# Patient Record
Sex: MALE | Race: ASIAN | NOT HISPANIC OR LATINO | Employment: PART TIME | URBAN - METROPOLITAN AREA
[De-identification: names, ages, dates, MRNs, and addresses within clinical notes are randomized per-mention and may not be internally consistent; named-entity substitution may affect disease eponyms.]

---

## 2023-07-02 ENCOUNTER — HOSPITAL ENCOUNTER (EMERGENCY)
Facility: HOSPITAL | Age: 23
Discharge: HOME | End: 2023-07-02
Attending: EMERGENCY MEDICINE
Payer: COMMERCIAL

## 2023-07-02 ENCOUNTER — APPOINTMENT (EMERGENCY)
Dept: CT IMAGING | Facility: HOSPITAL | Age: 23
End: 2023-07-02
Payer: COMMERCIAL

## 2023-07-02 ENCOUNTER — APPOINTMENT (EMERGENCY)
Dept: RADIOLOGY | Facility: HOSPITAL | Age: 23
End: 2023-07-02
Payer: COMMERCIAL

## 2023-07-02 VITALS
SYSTOLIC BLOOD PRESSURE: 112 MMHG | HEART RATE: 70 BPM | OXYGEN SATURATION: 100 % | TEMPERATURE: 99.1 F | WEIGHT: 150 LBS | DIASTOLIC BLOOD PRESSURE: 67 MMHG | RESPIRATION RATE: 18 BRPM | BODY MASS INDEX: 21.47 KG/M2 | HEIGHT: 70 IN

## 2023-07-02 DIAGNOSIS — R11.2 NAUSEA AND VOMITING, UNSPECIFIED VOMITING TYPE: Primary | ICD-10-CM

## 2023-07-02 DIAGNOSIS — R10.84 GENERALIZED ABDOMINAL PAIN: ICD-10-CM

## 2023-07-02 DIAGNOSIS — E86.0 DEHYDRATION: ICD-10-CM

## 2023-07-02 LAB
ALBUMIN SERPL BCP-MCNC: 5.5 G/DL (ref 3.5–5)
ALP SERPL-CCNC: 67 U/L (ref 34–104)
ALT SERPL W P-5'-P-CCNC: 25 U/L (ref 7–52)
AMPHETAMINES SERPL QL SCN: NEGATIVE
ANION GAP SERPL CALCULATED.3IONS-SCNC: 13 MMOL/L
APAP SERPL-MCNC: <10 UG/ML (ref 10–20)
AST SERPL W P-5'-P-CCNC: 30 U/L (ref 13–39)
BACTERIA UR QL AUTO: ABNORMAL /HPF
BARBITURATES UR QL: NEGATIVE
BASOPHILS # BLD AUTO: 0.04 THOUSANDS/ÂΜL (ref 0–0.1)
BASOPHILS NFR BLD AUTO: 0 % (ref 0–1)
BENZODIAZ UR QL: NEGATIVE
BILIRUB SERPL-MCNC: 1.07 MG/DL (ref 0.2–1)
BILIRUB UR QL STRIP: ABNORMAL
BUN SERPL-MCNC: 8 MG/DL (ref 5–25)
CALCIUM SERPL-MCNC: 10 MG/DL (ref 8.4–10.2)
CARDIAC TROPONIN I PNL SERPL HS: 4 NG/L
CHLORIDE SERPL-SCNC: 100 MMOL/L (ref 96–108)
CK SERPL-CCNC: 28 U/L (ref 39–308)
CLARITY UR: CLEAR
CO2 SERPL-SCNC: 24 MMOL/L (ref 21–32)
COCAINE UR QL: NEGATIVE
COLOR UR: YELLOW
CREAT SERPL-MCNC: 0.69 MG/DL (ref 0.6–1.3)
D DIMER PPP FEU-MCNC: <0.27 UG/ML FEU
EOSINOPHIL # BLD AUTO: 0.02 THOUSAND/ÂΜL (ref 0–0.61)
EOSINOPHIL NFR BLD AUTO: 0 % (ref 0–6)
ERYTHROCYTE [DISTWIDTH] IN BLOOD BY AUTOMATED COUNT: 13.1 % (ref 11.6–15.1)
ETHANOL SERPL-MCNC: <10 MG/DL
GFR SERPL CREATININE-BSD FRML MDRD: 133 ML/MIN/1.73SQ M
GLUCOSE SERPL-MCNC: 112 MG/DL (ref 65–140)
GLUCOSE UR STRIP-MCNC: NEGATIVE MG/DL
HCT VFR BLD AUTO: 46.3 % (ref 36.5–49.3)
HGB BLD-MCNC: 16.2 G/DL (ref 12–17)
HGB UR QL STRIP.AUTO: ABNORMAL
IMM GRANULOCYTES # BLD AUTO: 0.03 THOUSAND/UL (ref 0–0.2)
IMM GRANULOCYTES NFR BLD AUTO: 0 % (ref 0–2)
KETONES UR STRIP-MCNC: ABNORMAL MG/DL
LEUKOCYTE ESTERASE UR QL STRIP: NEGATIVE
LIPASE SERPL-CCNC: 14 U/L (ref 11–82)
LYMPHOCYTES # BLD AUTO: 2.09 THOUSANDS/ÂΜL (ref 0.6–4.47)
LYMPHOCYTES NFR BLD AUTO: 20 % (ref 14–44)
MCH RBC QN AUTO: 30.6 PG (ref 26.8–34.3)
MCHC RBC AUTO-ENTMCNC: 35 G/DL (ref 31.4–37.4)
MCV RBC AUTO: 88 FL (ref 82–98)
METHADONE UR QL: NEGATIVE
MONOCYTES # BLD AUTO: 0.79 THOUSAND/ÂΜL (ref 0.17–1.22)
MONOCYTES NFR BLD AUTO: 8 % (ref 4–12)
MUCOUS THREADS UR QL AUTO: ABNORMAL
NEUTROPHILS # BLD AUTO: 7.35 THOUSANDS/ÂΜL (ref 1.85–7.62)
NEUTS SEG NFR BLD AUTO: 72 % (ref 43–75)
NITRITE UR QL STRIP: NEGATIVE
NON-SQ EPI CELLS URNS QL MICRO: ABNORMAL /HPF
NRBC BLD AUTO-RTO: 0 /100 WBCS
OPIATES UR QL SCN: NEGATIVE
OXYCODONE+OXYMORPHONE UR QL SCN: NEGATIVE
PCP UR QL: NEGATIVE
PH UR STRIP.AUTO: 6.5 [PH]
PLATELET # BLD AUTO: 259 THOUSANDS/UL (ref 149–390)
PMV BLD AUTO: 9.8 FL (ref 8.9–12.7)
POTASSIUM SERPL-SCNC: 3.5 MMOL/L (ref 3.5–5.3)
PROT SERPL-MCNC: 8.1 G/DL (ref 6.4–8.4)
PROT UR STRIP-MCNC: ABNORMAL MG/DL
RBC # BLD AUTO: 5.29 MILLION/UL (ref 3.88–5.62)
RBC #/AREA URNS AUTO: ABNORMAL /HPF
SALICYLATES SERPL-MCNC: <5 MG/DL (ref 3–20)
SODIUM SERPL-SCNC: 137 MMOL/L (ref 135–147)
SP GR UR STRIP.AUTO: 1.01 (ref 1–1.03)
THC UR QL: POSITIVE
TSH SERPL DL<=0.05 MIU/L-ACNC: 0.66 UIU/ML (ref 0.45–4.5)
UROBILINOGEN UR QL STRIP.AUTO: 0.2 E.U./DL
WBC # BLD AUTO: 10.32 THOUSAND/UL (ref 4.31–10.16)
WBC #/AREA URNS AUTO: ABNORMAL /HPF

## 2023-07-02 PROCEDURE — 80143 DRUG ASSAY ACETAMINOPHEN: CPT | Performed by: EMERGENCY MEDICINE

## 2023-07-02 PROCEDURE — 99285 EMERGENCY DEPT VISIT HI MDM: CPT

## 2023-07-02 PROCEDURE — 84443 ASSAY THYROID STIM HORMONE: CPT | Performed by: EMERGENCY MEDICINE

## 2023-07-02 PROCEDURE — 82550 ASSAY OF CK (CPK): CPT | Performed by: EMERGENCY MEDICINE

## 2023-07-02 PROCEDURE — 85025 COMPLETE CBC W/AUTO DIFF WBC: CPT | Performed by: EMERGENCY MEDICINE

## 2023-07-02 PROCEDURE — 74177 CT ABD & PELVIS W/CONTRAST: CPT

## 2023-07-02 PROCEDURE — 87491 CHLMYD TRACH DNA AMP PROBE: CPT | Performed by: EMERGENCY MEDICINE

## 2023-07-02 PROCEDURE — 80307 DRUG TEST PRSMV CHEM ANLYZR: CPT | Performed by: EMERGENCY MEDICINE

## 2023-07-02 PROCEDURE — 96374 THER/PROPH/DIAG INJ IV PUSH: CPT

## 2023-07-02 PROCEDURE — 80179 DRUG ASSAY SALICYLATE: CPT | Performed by: EMERGENCY MEDICINE

## 2023-07-02 PROCEDURE — 82077 ASSAY SPEC XCP UR&BREATH IA: CPT | Performed by: EMERGENCY MEDICINE

## 2023-07-02 PROCEDURE — 84484 ASSAY OF TROPONIN QUANT: CPT | Performed by: EMERGENCY MEDICINE

## 2023-07-02 PROCEDURE — 83690 ASSAY OF LIPASE: CPT | Performed by: EMERGENCY MEDICINE

## 2023-07-02 PROCEDURE — 71045 X-RAY EXAM CHEST 1 VIEW: CPT

## 2023-07-02 PROCEDURE — 80053 COMPREHEN METABOLIC PANEL: CPT | Performed by: EMERGENCY MEDICINE

## 2023-07-02 PROCEDURE — 96361 HYDRATE IV INFUSION ADD-ON: CPT

## 2023-07-02 PROCEDURE — 93005 ELECTROCARDIOGRAM TRACING: CPT

## 2023-07-02 PROCEDURE — 96375 TX/PRO/DX INJ NEW DRUG ADDON: CPT

## 2023-07-02 PROCEDURE — 36415 COLL VENOUS BLD VENIPUNCTURE: CPT | Performed by: EMERGENCY MEDICINE

## 2023-07-02 PROCEDURE — 87591 N.GONORRHOEAE DNA AMP PROB: CPT | Performed by: EMERGENCY MEDICINE

## 2023-07-02 PROCEDURE — G1004 CDSM NDSC: HCPCS

## 2023-07-02 PROCEDURE — 85379 FIBRIN DEGRADATION QUANT: CPT | Performed by: EMERGENCY MEDICINE

## 2023-07-02 PROCEDURE — 81001 URINALYSIS AUTO W/SCOPE: CPT | Performed by: EMERGENCY MEDICINE

## 2023-07-02 RX ORDER — ONDANSETRON 2 MG/ML
4 INJECTION INTRAMUSCULAR; INTRAVENOUS ONCE
Status: COMPLETED | OUTPATIENT
Start: 2023-07-02 | End: 2023-07-02

## 2023-07-02 RX ORDER — ONDANSETRON 4 MG/1
4 TABLET, FILM COATED ORAL EVERY 8 HOURS PRN
Qty: 15 TABLET | Refills: 0 | Status: SHIPPED | OUTPATIENT
Start: 2023-07-02 | End: 2023-07-07

## 2023-07-02 RX ORDER — KETOROLAC TROMETHAMINE 30 MG/ML
15 INJECTION, SOLUTION INTRAMUSCULAR; INTRAVENOUS ONCE
Status: COMPLETED | OUTPATIENT
Start: 2023-07-02 | End: 2023-07-02

## 2023-07-02 RX ADMIN — IOHEXOL 80 ML: 350 INJECTION, SOLUTION INTRAVENOUS at 08:17

## 2023-07-02 RX ADMIN — SODIUM CHLORIDE 1000 ML: 0.9 INJECTION, SOLUTION INTRAVENOUS at 07:27

## 2023-07-02 RX ADMIN — SODIUM CHLORIDE 1000 ML: 0.9 INJECTION, SOLUTION INTRAVENOUS at 09:42

## 2023-07-02 RX ADMIN — ONDANSETRON 4 MG: 2 INJECTION INTRAMUSCULAR; INTRAVENOUS at 07:22

## 2023-07-02 RX ADMIN — KETOROLAC TROMETHAMINE 15 MG: 30 INJECTION, SOLUTION INTRAMUSCULAR at 07:23

## 2023-07-02 NOTE — ED NOTES
This writer discussed the patients current presentation and recommended discharge plan with . They agree with the patient being discharged at this time with referrals and/or information about :  Patient declined psychiatric services. The patient was Instructed to follow up with their Patient declined  The patient was provided with referral information for:   Patient declined resources    The patient declined to complete a safety plan however a blank plan was provided for future use.  (if applicable, if not, delete)      SAFETY PLAN  Warning Signs (thoughts, images, mood, behavior, situations) of a potential crisis:   Coping Skills (what can I do to take my mind off the problem, or to keep myself safe):    Outside Support (who can I reach out to for support and help):       National Suicide Prevention Hotline:  [address]: [address] [facility] [address]: [address] [place] [address] [address] [phone] - Crisis   [phone] - Peer [address] (1-9pm daily)  [phone] - Teen Support Talk Line (1-9pm daily)  [address] [place] [address] 1815 [facility] [address] [place] [address] [address] [place] [address]) [phone] - [address] [facility]

## 2023-07-02 NOTE — ED PROVIDER NOTES
History  Chief Complaint   Patient presents with   • Medical Problem     Patient reports chest pain, stomach pain and penile pain x1 day. Patient stating about gf possibly putting something in food he ate yesterday. 24-year-old male with no past medical history who presents for evaluation of abdominal pain. History difficult to obtain from patient as his speech is very tangential.  Patient is endorsing chest, diffuse abdominal, and penile pain which seems to have started over the last 2 to 3 days. He complains of nausea and vomiting which she states has been going on for the past 4 years every time he eats. He denies fever or shortness of breath. He does endorse dysuria but no frequency. He denies diarrhea. Patient telling multiple extravagant stories such as finding a bloody tampon and a watermelon that he was eating. He also talks about [facility] stating that his  from the airport was paid $80 to take him somewhere other than his intended destination. He also comments about a woman that he knows that he feels may have poisoned him. He admits that his behavior is erratic and that he feels very hyper. He states that this is new within the past 2 weeks. None       History reviewed. No pertinent past medical history. History reviewed. No pertinent surgical history. History reviewed. No pertinent family history. I have reviewed and agree with the history as documented. E-Cigarette/Vaping   • E-Cigarette Use Never User      E-Cigarette/Vaping Substances     Social History     Tobacco Use   • Smoking status: Every Day     Packs/day: 1.00     Types: Cigarettes   • Smokeless tobacco: Never   Vaping Use   • Vaping Use: Never used   Substance Use Topics   • Alcohol use: Not Currently   • Drug use: Yes     Types: Marijuana       Review of Systems   Constitutional: Negative for fever. Respiratory: Negative for shortness of breath. Cardiovascular: Positive for chest pain. Gastrointestinal: Positive for abdominal pain, nausea and vomiting. Negative for constipation and diarrhea. Genitourinary: Positive for dysuria and penile pain. Negative for flank pain and frequency. Musculoskeletal: Negative for gait problem. Skin: Negative for rash. Neurological: Negative for weakness and light-headedness. Psychiatric/Behavioral: The patient is hyperactive. All other systems reviewed and are negative. Physical Exam  Physical Exam  Vitals and nursing note reviewed. Exam conducted with a chaperone present (Moon Morrow RN). Constitutional:       General: He is not in acute distress. Appearance: He is not ill-appearing. HENT:      Head: Normocephalic and atraumatic. Nose: Nose normal.      Mouth/Throat:      Mouth: Mucous membranes are moist.   Eyes:      Extraocular Movements: Extraocular movements intact. Conjunctiva/sclera: Conjunctivae normal.      Pupils: Pupils are equal, round, and reactive to light. Cardiovascular:      Rate and Rhythm: Normal rate and regular rhythm. Heart sounds: No murmur heard. No friction rub. No gallop. Comments: Patient noted to be intermittently tachycardic at various points in obtaining patient's history. Pulmonary:      Effort: Pulmonary effort is normal.      Breath sounds: Normal breath sounds. No wheezing, rhonchi or rales. Abdominal:      General: There is no distension. Palpations: Abdomen is soft. Tenderness: There is no abdominal tenderness. Genitourinary:     Penis: Normal and circumcised. No tenderness, discharge, swelling or lesions. Testes: Normal.         Right: Tenderness or swelling not present. Left: Tenderness or swelling not present. Musculoskeletal:         General: No swelling or tenderness. Normal range of motion. Cervical back: Normal range of motion and neck supple. Skin:     General: Skin is warm and dry. Coloration: Skin is not pale. Findings: No rash. Neurological:      General: No focal deficit present. Mental Status: He is alert and oriented to person, place, and time. Psychiatric:         Attention and Perception: Attention normal.         Mood and Affect: Mood is anxious and elated. Speech: Speech is rapid and pressured and tangential.         Behavior: Behavior is hyperactive. Thought Content: Thought content is paranoid and delusional. Thought content does not include homicidal or suicidal ideation.          Vital Signs  ED Triage Vitals   Temperature Pulse Respirations Blood Pressure SpO2   07/02/23 0652 07/02/23 0652 07/02/23 0652 07/02/23 0652 07/02/23 0652   99.1 °F (37.3 °C) 89 20 145/95 100 %      Temp Source Heart Rate Source Patient Position - Orthostatic VS BP Location FiO2 (%)   07/02/23 0652 07/02/23 0652 07/02/23 0652 07/02/23 0652 --   Oral Monitor Sitting Right arm       Pain Score       07/02/23 0723       8           Vitals:    07/02/23 0730 07/02/23 0800 07/02/23 0900 07/02/23 1124   BP: 118/79 130/86 119/72 112/67   Pulse: 87 96 79 70   Patient Position - Orthostatic VS:   Sitting Lying         Visual Acuity  Visual Acuity    Flowsheet Row Most Recent Value   L Pupil Size (mm) 3   R Pupil Size (mm) 3          ED Medications  Medications   ketorolac (TORADOL) injection 15 mg (15 mg Intravenous Given 7/2/23 0723)   sodium chloride 0.9 % bolus 1,000 mL (0 mL Intravenous Stopped 7/2/23 0847)   ondansetron (ZOFRAN) injection 4 mg (4 mg Intravenous Given 7/2/23 0722)   iohexol (OMNIPAQUE) 350 MG/ML injection (SINGLE-DOSE) 100 mL (80 mL Intravenous Given 7/2/23 0817)   sodium chloride 0.9 % bolus 1,000 mL (0 mL Intravenous Stopped 7/2/23 1123)       Diagnostic Studies  Results Reviewed     Procedure Component Value Units Date/Time    Rapid drug screen, urine [[id number]]  (Abnormal) Collected: 07/02/23 0907    Lab Status: Final result Specimen: Urine, Clean Catch Updated: 07/02/23 0942     Amph/Meth UR Negative     Barbiturate Ur Negative     Benzodiazepine Urine Negative     Cocaine Urine Negative     Methadone Urine Negative     Opiate Urine Negative     PCP Ur Negative     THC Urine Positive     Oxycodone Urine Negative    Narrative:      Presumptive report. If requested, specimen will be sent to reference lab for confirmation. FOR MEDICAL PURPOSES ONLY. IF CONFIRMATION NEEDED PLEASE CONTACT THE LAB WITHIN 5 DAYS. Drug Screen Cutoff Levels:  AMPHETAMINE/METHAMPHETAMINES  1000 ng/mL  BARBITURATES     200 ng/mL  BENZODIAZEPINES     200 ng/mL  COCAINE      300 ng/mL  METHADONE      300 ng/mL  OPIATES      300 ng/mL  PHENCYCLIDINE     25 ng/mL  THC       50 ng/mL  OXYCODONE      100 ng/mL    Urine Microscopic [[id number]]  (Abnormal) Collected: 07/02/23 0907    Lab Status: Final result Specimen: Urine, Clean Catch Updated: 07/02/23 0934     RBC, UA 0-1 /hpf      WBC, UA None Seen /hpf      Epithelial Cells None Seen /hpf      Bacteria, UA Occasional /hpf      MUCUS THREADS Moderate    UA w Reflex to Microscopic w Reflex to Culture [[id number]]  (Abnormal) Collected: 07/02/23 0907    Lab Status: Final result Specimen: Urine, Clean Catch Updated: 07/02/23 0929     Color, UA Yellow     Clarity, UA Clear     Specific Gravity, UA 1.015     pH, UA 6.5     Leukocytes, UA Negative     Nitrite, UA Negative     Protein, UA Trace mg/dl      Glucose, UA Negative mg/dl      Ketones, UA 80 (3+) mg/dl      Urobilinogen, UA 0.2 E.U./dl      Bilirubin, UA 1+     Occult Blood, UA Trace-Intact    Chlamydia/GC amplified DNA by PCR [[id number]] Collected: 07/02/23 0907    Lab Status:  In process Specimen: Urine, Other Updated: 07/02/23 0913    TSH, 3rd generation with Free T4 reflex [[id number]]  (Normal) Collected: 07/02/23 0718    Lab Status: Final result Specimen: Blood from Arm, Left Updated: 07/02/23 0756     TSH 3RD GENERATON 0.659 uIU/mL     D-Dimer [[id number]]  (Normal) Collected: 07/02/23 0718    Lab Status: Final result Specimen: Blood from Arm, Left Updated: 07/02/23 0753     D-Dimer, Quant <0.27 ug/ml FEU     HS Troponin 0hr (reflex protocol) [[id number]]  (Normal) Collected: 07/02/23 0718    Lab Status: Final result Specimen: Blood from Arm, Left Updated: 07/02/23 0748     hs TnI 0hr 4 ng/L     Salicylate level [[id number]]  (Normal) Collected: 07/02/23 0718    Lab Status: Final result Specimen: Blood from Arm, Left Updated: 70/20/91 0201     Salicylate Lvl <5 mg/dL     Acetaminophen level-If concentration is detectable, please discuss with medical  on call.  [[id number]]  (Abnormal) Collected: 07/02/23 0718    Lab Status: Final result Specimen: Blood from Arm, Left Updated: 07/02/23 0744     Acetaminophen Level <10 ug/mL     Ethanol [[id number]]  (Normal) Collected: 07/02/23 0718    Lab Status: Final result Specimen: Blood from Arm, Left Updated: 07/02/23 0743     Ethanol Lvl <10 mg/dL     Comprehensive metabolic panel [[id number]]  (Abnormal) Collected: 07/02/23 0718    Lab Status: Final result Specimen: Blood from Arm, Left Updated: 07/02/23 0743     Sodium 137 mmol/L      Potassium 3.5 mmol/L      Chloride 100 mmol/L      CO2 24 mmol/L      ANION GAP 13 mmol/L      BUN 8 mg/dL      Creatinine 0.69 mg/dL      Glucose 112 mg/dL      Calcium 10.0 mg/dL      AST 30 U/L      ALT 25 U/L      Alkaline Phosphatase 67 U/L      Total Protein 8.1 g/dL      Albumin 5.5 g/dL      Total Bilirubin 1.07 mg/dL      eGFR 133 ml/min/1.73sq m     Narrative:      [place] guidelines for Chronic Kidney Disease (CKD):   •  Stage 1 with normal or high GFR (GFR > 90 mL/min/1.73 square meters)  •  Stage 2 Mild CKD (GFR = 60-89 mL/min/1.73 square meters)  •  Stage 3A Moderate CKD (GFR = 45-59 mL/min/1.73 square meters)  •  Stage 3B Moderate CKD (GFR = 30-44 mL/min/1.73 square meters)  •  Stage 4 Severe CKD (GFR = 15-29 mL/min/1.73 square meters)  •  Stage 5 End Stage CKD (GFR <15 mL/min/1.73 square meters)  Note: GFR calculation is accurate only with a steady state creatinine    Lipase [[id number]]  (Normal) Collected: 07/02/23 0718    Lab Status: Final result Specimen: Blood from Arm, Left Updated: 07/02/23 0743     Lipase 14 u/L     CK [[id number]]  (Abnormal) Collected: 07/02/23 0718    Lab Status: Final result Specimen: Blood from Arm, Left Updated: 07/02/23 0743     Total CK 28 U/L     CBC and differential [[id number]]  (Abnormal) Collected: 07/02/23 0718    Lab Status: Final result Specimen: Blood from Arm, Left Updated: 07/02/23 0723     WBC 10.32 Thousand/uL      RBC 5.29 Million/uL      Hemoglobin 16.2 g/dL      Hematocrit 46.3 %      MCV 88 fL      MCH 30.6 pg      MCHC 35.0 g/dL      RDW 13.1 %      MPV 9.8 fL      Platelets 161 Thousands/uL      nRBC 0 /100 WBCs      Neutrophils Relative 72 %      Immat GRANS % 0 %      Lymphocytes Relative 20 %      Monocytes Relative 8 %      Eosinophils Relative 0 %      Basophils Relative 0 %      Neutrophils Absolute 7.35 Thousands/µL      Immature Grans Absolute 0.03 Thousand/uL      Lymphocytes Absolute 2.09 Thousands/µL      Monocytes Absolute 0.79 Thousand/µL      Eosinophils Absolute 0.02 Thousand/µL      Basophils Absolute 0.04 Thousands/µL                  XR chest 1 view portable   ED Interpretation by hKoa Burt MD (07/02 1178)   No acute cardiopulmonary abnormality. Independently interpreted by me. Final Result by Shea Rebolledo MD (07/02 1101)      No acute cardiopulmonary disease. Workstation performed: [id number]         CT abdomen pelvis with contrast   Final Result by Vivian Wilhelm MD (07/02 0912)      Slight thickening the stomach versus under distention as well as some prominence of small bowel loops. Findings could be related to mild gastroenteritis although nonspecific. No evidence of small bowel obstruction or inflammatory change. No hydronephrosis.       Mild bladder wall thickening versus underdistention. Correlation with urinalysis for any evidence of urinary tract infection may be useful. Tenting of the dome of the bladder may be related to a urachal remnant. Preliminary results were discussed with Dr. Heraclio Pelayo at 9:05 a.m. Workstation performed: [id number]                    Procedures  Procedures         ED Course  ED Course as of 07/02/23 1333   Sun Jul 02, 2023   4398 Procedure Note: EKG  Date/Time: 07/02/23 7:09 AM   Interpreted by: Hung Anderson  Indications / Diagnosis: CP  ECG reviewed by me, the ED Provider: yes   The EKG demonstrates:  Rhythm: rate 72, normal sinus  Intervals: normal intervals  Axis: normal axis  QRS/Blocks: normal QRS  ST Changes: No acute ST Changes, no STD/ZAIRA.    0726 CBC and differential(!)   0744 Total CK(!): 28   0744 Lipase: 14   0744 Comprehensive metabolic panel(!)   1023 Coma panel(!)   0748 hs TnI 0hr: 4   0756 D-Dimer, Quant: <0.27   0757 TSH 3RD GENERATON: 0.659   0942 Rapid drug screen, urine(!)   1032 Patient feeling improved from a nausea/vomiting standpoint. Tolerating oral intake. Discussed with Alley from crisis who evaluated patient. He does not feel that his current issues are psychiatric related and does not wish to pursue any psychiatric treatment. Patient currently does not appear to be a harm to himself or others around him. No criteria for 302. Patient is stable for discharge at this time. Medical Decision Making  72-year-old male presenting for evaluation of abdominal pain, nausea, vomiting. Patient is a difficult historian, endorsing multiple paranoid delusions. Differential diagnoses regarding his symptoms include but not limited to gastroenteritis, gastritis, cholecystitis, pancreatitis, obstruction, perforation, UTI. Labs significant for mild nonspecific leukocytosis. Otherwise within normal limits. UA notable for ketones suggestive of dehydration, no UTI.   CT showing findings consistent with nonspecific gastroenteritis. No obstruction. Patient was feeling significantly improved after symptomatic treatment. Patient was evaluated by crisis given his paranoid delusions. He does not desire psychiatric treatment at this time. No criteria for 302. Patient stable for discharge. Advised follow-up with primary care physician. Return precautions discussed. Dehydration: acute illness or injury  Generalized abdominal pain: acute illness or injury  Nausea and vomiting, unspecified vomiting type: acute illness or injury  Amount and/or Complexity of Data Reviewed  Labs: ordered. Decision-making details documented in ED Course. Radiology: ordered and independent interpretation performed. ECG/medicine tests: ordered and independent interpretation performed. Decision-making details documented in ED Course. Risk  Prescription drug management. Disposition  Final diagnoses:   Nausea and vomiting, unspecified vomiting type   Dehydration   Generalized abdominal pain     Time reflects when diagnosis was documented in both MDM as applicable and the Disposition within this note     Time User Action Codes Description Comment    7/2/2023 10:34 AM Crissy Morataya Add [R11.2] Nausea and vomiting, unspecified vomiting type     7/2/2023 10:34 AM Crissy Morataya Add [E86.0] Dehydration     7/2/2023 10:34 AM Crissy Morataya Add [R10.84] Generalized abdominal pain       ED Disposition     ED Disposition   Discharge    Condition   Stable    Date/Time   Sun Jul 2, 2023 10:34 AM    Comment   Ann Vargas discharge to home/self care.                MD Documentation    Flowsheet Row Most Recent Value   Sending MD Crissy Morataya MD      Follow-up Information    None         Discharge Medication List as of 7/2/2023 10:37 AM      START taking these medications    Details   ondansetron (ZOFRAN) 4 mg tablet Take 1 tablet (4 mg total) by mouth every 8 (eight) hours as needed for nausea or vomiting for up to 5 days, Starting Sun 7/2/2023, Until Fri 7/7/2023 at 2359, Normal             No discharge procedures on file.     PDMP Review     None          ED Provider  Electronically Signed by           Zahida Benites MD  07/02/23 0677

## 2023-07-02 NOTE — ED NOTES
CIS met with patient, father at bedside, to complete assessments. Patient was brought to the ER by a family member for medical complaints due to the belief that someone is poisoning him. Patient is a 21year old male with no psychiatric history. He presented with delusions and paranoia with no insight into his condition. Patient appeared anxious with tangential speech. He believes someone is trying to harm him by doing things to his car and that his girlfriend is trying to poison him. Patient denied SI, HI, paranoia, AVH, depression. Patient endorsed anxiety which he believes to be triggered by people wanting him dead. Patient reported that he smokes week and he was positive for THC. He denied any disturbances in appetite, sleep, or concentration. Patient and his father did not want any kind of psychiatric treatment or behavioral health resources. Patient does not appear to be a threat to himself or others at this time.

## 2023-07-02 NOTE — DISCHARGE INSTRUCTIONS
Follow up with your primary care physician. Take the prescribed medication as directed. Stay well hydrated. Please return to the emergency department if you develop worsening symptoms, severe pain, uncontrolled vomiting, or anything else concerning to you. SAFETY PLAN  Warning Signs (thoughts, images, mood, behavior, situations) of a potential crisis:   Coping Skills (what can I do to take my mind off the problem, or to keep myself safe):    Outside Support (who can I reach out to for support and help):       National Suicide Prevention Hotline:  [address]: [address] [facility] [address]: [address] [place] [address] [phone] - Crisis   [phone] - Peer [address] (1-9pm daily)  [phone] - Teen Support Talk Line (1-9pm daily)  [address] [place] [address] [address]15 [facility] [address] [place] [address] [address] [place] [address]) [phone] - [address] [facility]

## 2023-07-03 ENCOUNTER — HOSPITAL ENCOUNTER (EMERGENCY)
Facility: HOSPITAL | Age: 23
End: 2023-07-04
Attending: EMERGENCY MEDICINE
Payer: COMMERCIAL

## 2023-07-03 DIAGNOSIS — R45.1 AGITATION: Primary | ICD-10-CM

## 2023-07-03 DIAGNOSIS — F29 PSYCHOSIS (HCC): ICD-10-CM

## 2023-07-03 LAB
ALBUMIN SERPL BCP-MCNC: 5.2 G/DL (ref 3.5–5)
ALP SERPL-CCNC: 64 U/L (ref 34–104)
ALT SERPL W P-5'-P-CCNC: 19 U/L (ref 7–52)
AMPHETAMINES SERPL QL SCN: NEGATIVE
ANION GAP SERPL CALCULATED.3IONS-SCNC: 12 MMOL/L
APAP SERPL-MCNC: <10 UG/ML (ref 10–20)
AST SERPL W P-5'-P-CCNC: 15 U/L (ref 13–39)
BARBITURATES UR QL: NEGATIVE
BASOPHILS # BLD AUTO: 0.05 THOUSANDS/ÂΜL (ref 0–0.1)
BASOPHILS NFR BLD AUTO: 1 % (ref 0–1)
BENZODIAZ UR QL: NEGATIVE
BILIRUB SERPL-MCNC: 1.59 MG/DL (ref 0.2–1)
BILIRUB UR QL STRIP: NEGATIVE
BUN SERPL-MCNC: 8 MG/DL (ref 5–25)
C TRACH DNA SPEC QL NAA+PROBE: NEGATIVE
CALCIUM SERPL-MCNC: 9.9 MG/DL (ref 8.4–10.2)
CHLORIDE SERPL-SCNC: 102 MMOL/L (ref 96–108)
CLARITY UR: CLEAR
CO2 SERPL-SCNC: 24 MMOL/L (ref 21–32)
COCAINE UR QL: NEGATIVE
COLOR UR: YELLOW
CREAT SERPL-MCNC: 0.65 MG/DL (ref 0.6–1.3)
EOSINOPHIL # BLD AUTO: 0.02 THOUSAND/ÂΜL (ref 0–0.61)
EOSINOPHIL NFR BLD AUTO: 0 % (ref 0–6)
ERYTHROCYTE [DISTWIDTH] IN BLOOD BY AUTOMATED COUNT: 12.9 % (ref 11.6–15.1)
ETHANOL SERPL-MCNC: <10 MG/DL
GFR SERPL CREATININE-BSD FRML MDRD: 137 ML/MIN/1.73SQ M
GLUCOSE SERPL-MCNC: 91 MG/DL (ref 65–140)
GLUCOSE UR STRIP-MCNC: NEGATIVE MG/DL
HCT VFR BLD AUTO: 43.5 % (ref 36.5–49.3)
HGB BLD-MCNC: 15.5 G/DL (ref 12–17)
HGB UR QL STRIP.AUTO: NEGATIVE
IMM GRANULOCYTES # BLD AUTO: 0.03 THOUSAND/UL (ref 0–0.2)
IMM GRANULOCYTES NFR BLD AUTO: 0 % (ref 0–2)
KETONES UR STRIP-MCNC: ABNORMAL MG/DL
LEUKOCYTE ESTERASE UR QL STRIP: NEGATIVE
LYMPHOCYTES # BLD AUTO: 1.96 THOUSANDS/ÂΜL (ref 0.6–4.47)
LYMPHOCYTES NFR BLD AUTO: 22 % (ref 14–44)
MCH RBC QN AUTO: 30.6 PG (ref 26.8–34.3)
MCHC RBC AUTO-ENTMCNC: 35.6 G/DL (ref 31.4–37.4)
MCV RBC AUTO: 86 FL (ref 82–98)
METHADONE UR QL: NEGATIVE
MONOCYTES # BLD AUTO: 0.72 THOUSAND/ÂΜL (ref 0.17–1.22)
MONOCYTES NFR BLD AUTO: 8 % (ref 4–12)
N GONORRHOEA DNA SPEC QL NAA+PROBE: NEGATIVE
NEUTROPHILS # BLD AUTO: 6.13 THOUSANDS/ÂΜL (ref 1.85–7.62)
NEUTS SEG NFR BLD AUTO: 69 % (ref 43–75)
NITRITE UR QL STRIP: NEGATIVE
NRBC BLD AUTO-RTO: 0 /100 WBCS
OPIATES UR QL SCN: NEGATIVE
OXYCODONE+OXYMORPHONE UR QL SCN: NEGATIVE
PCP UR QL: NEGATIVE
PH UR STRIP.AUTO: 8 [PH]
PLATELET # BLD AUTO: 242 THOUSANDS/UL (ref 149–390)
PMV BLD AUTO: 10.1 FL (ref 8.9–12.7)
POTASSIUM SERPL-SCNC: 3.3 MMOL/L (ref 3.5–5.3)
PROT SERPL-MCNC: 7.6 G/DL (ref 6.4–8.4)
PROT UR STRIP-MCNC: NEGATIVE MG/DL
RBC # BLD AUTO: 5.07 MILLION/UL (ref 3.88–5.62)
SALICYLATES SERPL-MCNC: <5 MG/DL (ref 3–20)
SODIUM SERPL-SCNC: 138 MMOL/L (ref 135–147)
SP GR UR STRIP.AUTO: 1.01 (ref 1–1.03)
THC UR QL: POSITIVE
UROBILINOGEN UR QL STRIP.AUTO: 0.2 E.U./DL
WBC # BLD AUTO: 8.91 THOUSAND/UL (ref 4.31–10.16)

## 2023-07-03 PROCEDURE — 80053 COMPREHEN METABOLIC PANEL: CPT | Performed by: EMERGENCY MEDICINE

## 2023-07-03 PROCEDURE — 85025 COMPLETE CBC W/AUTO DIFF WBC: CPT | Performed by: EMERGENCY MEDICINE

## 2023-07-03 PROCEDURE — 96374 THER/PROPH/DIAG INJ IV PUSH: CPT

## 2023-07-03 PROCEDURE — 82077 ASSAY SPEC XCP UR&BREATH IA: CPT | Performed by: EMERGENCY MEDICINE

## 2023-07-03 PROCEDURE — 99285 EMERGENCY DEPT VISIT HI MDM: CPT

## 2023-07-03 PROCEDURE — 80307 DRUG TEST PRSMV CHEM ANLYZR: CPT | Performed by: EMERGENCY MEDICINE

## 2023-07-03 PROCEDURE — 80143 DRUG ASSAY ACETAMINOPHEN: CPT | Performed by: EMERGENCY MEDICINE

## 2023-07-03 PROCEDURE — 80179 DRUG ASSAY SALICYLATE: CPT | Performed by: EMERGENCY MEDICINE

## 2023-07-03 PROCEDURE — 93005 ELECTROCARDIOGRAM TRACING: CPT

## 2023-07-03 PROCEDURE — 96361 HYDRATE IV INFUSION ADD-ON: CPT

## 2023-07-03 PROCEDURE — 81003 URINALYSIS AUTO W/O SCOPE: CPT | Performed by: EMERGENCY MEDICINE

## 2023-07-03 PROCEDURE — 36415 COLL VENOUS BLD VENIPUNCTURE: CPT | Performed by: EMERGENCY MEDICINE

## 2023-07-03 RX ORDER — POTASSIUM CHLORIDE 20 MEQ/1
40 TABLET, EXTENDED RELEASE ORAL ONCE
Status: COMPLETED | OUTPATIENT
Start: 2023-07-03 | End: 2023-07-03

## 2023-07-03 RX ORDER — LORAZEPAM 2 MG/ML
1 INJECTION INTRAMUSCULAR ONCE
Status: COMPLETED | OUTPATIENT
Start: 2023-07-03 | End: 2023-07-03

## 2023-07-03 RX ADMIN — LORAZEPAM 1 MG: 2 INJECTION INTRAMUSCULAR; INTRAVENOUS at 18:40

## 2023-07-03 RX ADMIN — SODIUM CHLORIDE 1000 ML: 0.9 INJECTION, SOLUTION INTRAVENOUS at 17:09

## 2023-07-03 RX ADMIN — POTASSIUM CHLORIDE 40 MEQ: 1500 TABLET, EXTENDED RELEASE ORAL at 22:09

## 2023-07-03 NOTE — ED PROVIDER NOTES
History  Chief Complaint   Patient presents with   • Agitation     Pt reports "brain hurting and abdominal pain" per EMS; pt found by [facility] PD; pt reports that he was at [facility] buffet eating watermelon and got scared     45-year-old male comes in for altered mental status/psychiatric evaluation. Patient unable to give me a concrete reason why he is here in the emergency department. Patient was seen here yesterday for delusions about people trying to poison him. He seems to be here again today for something similar he is convinced that someone is trying to poison him. He is very tangential and starts talking about his childhood in [facility] made threatening statements about killing people. I cannot lockhim down to that he actually wants to hurt anyone. History provided by:  Patient   used: No    Psychiatric Evaluation  Presenting symptoms: agitation, bizarre behavior, delusional and homicidal ideas    Degree of incapacity (severity):  Severe  Onset quality:  Unable to specify  Progression:  Worsening  Chronicity:  New  Context: drug abuse    Treatment compliance:  Untreated  Ineffective treatments:  None tried  Associated symptoms: anxiety and poor judgment    Associated symptoms: no abdominal pain, no appetite change, no chest pain and no headaches        Prior to Admission Medications   Prescriptions Last Dose Informant Patient Reported? Taking?   ondansetron (ZOFRAN) 4 mg tablet   No No   Sig: Take 1 tablet (4 mg total) by mouth every 8 (eight) hours as needed for nausea or vomiting for up to 5 days      Facility-Administered Medications: None       History reviewed. No pertinent past medical history. History reviewed. No pertinent surgical history. History reviewed. No pertinent family history. I have reviewed and agree with the history as documented.     E-Cigarette/Vaping   • E-Cigarette Use Never User      E-Cigarette/Vaping Substances     Social History     Tobacco Use   • Smoking status: Every Day     Packs/day: 1.00     Types: Cigarettes   • Smokeless tobacco: Never   Vaping Use   • Vaping Use: Never used   Substance Use Topics   • Alcohol use: Not Currently   • Drug use: Yes     Types: Marijuana       Review of Systems   Constitutional: Negative for activity change and appetite change. HENT: Negative for congestion and facial swelling. Eyes: Negative for discharge and redness. Respiratory: Negative for cough and wheezing. Cardiovascular: Negative for chest pain and leg swelling. Gastrointestinal: Negative for abdominal distention, abdominal pain and blood in stool. Endocrine: Negative for cold intolerance and polydipsia. Genitourinary: Negative for difficulty urinating and hematuria. Musculoskeletal: Negative for arthralgias and gait problem. Skin: Negative for color change and rash. Allergic/Immunologic: Negative for food allergies and immunocompromised state. Neurological: Negative for dizziness, seizures and headaches. Hematological: Negative for adenopathy. Does not bruise/bleed easily. Psychiatric/Behavioral: Positive for agitation, confusion, decreased concentration and homicidal ideas. The patient is nervous/anxious. All other systems reviewed and are negative. Physical Exam  Physical Exam  Vitals and nursing note reviewed. Constitutional:       General: He is in acute distress. Appearance: He is well-developed. He is diaphoretic. HENT:      Head: Normocephalic and atraumatic. Eyes:      Conjunctiva/sclera: Conjunctivae normal.   Cardiovascular:      Rate and Rhythm: Normal rate and regular rhythm. Heart sounds: No murmur heard. Pulmonary:      Effort: Pulmonary effort is normal. No respiratory distress. Breath sounds: Normal breath sounds. Abdominal:      Palpations: Abdomen is soft. Tenderness: There is no abdominal tenderness. Musculoskeletal:         General: No swelling. Cervical back: Neck supple. Skin:     General: Skin is warm. Capillary Refill: Capillary refill takes less than 2 seconds. Neurological:      Mental Status: He is alert. Psychiatric:         Mood and Affect: Mood is anxious. Speech: Speech is rapid and pressured and tangential.         Behavior: Behavior is agitated. Thought Content: Thought content is paranoid and delusional.         Judgment: Judgment is impulsive and inappropriate. Vital Signs  ED Triage Vitals   Temperature Pulse Respirations Blood Pressure SpO2   07/03/23 1638 07/03/23 1638 07/03/23 1638 07/03/23 1638 07/03/23 1638   98.2 °F (36.8 °C) 81 20 115/82 100 %      Temp Source Heart Rate Source Patient Position - Orthostatic VS BP Location FiO2 (%)   07/03/23 1638 07/03/23 1638 07/03/23 1758 07/03/23 1758 --   Oral Monitor Lying Right arm       Pain Score       07/03/23 1758       No Pain           Vitals:    07/03/23 1638 07/03/23 1758   BP: 115/82 123/81   Pulse: 81 82   Patient Position - Orthostatic VS:  Lying         Visual Acuity      ED Medications  Medications   LORazepam (ATIVAN) injection 1 mg (has no administration in time range)   sodium chloride 0.9 % bolus 1,000 mL (0 mL Intravenous Stopped 7/3/23 1837)       Diagnostic Studies  Results Reviewed     Procedure Component Value Units Date/Time    Rapid drug screen, urine [[id number]]  (Abnormal) Collected: 07/03/23 1727    Lab Status: Final result Specimen: Urine, Clean Catch Updated: 07/03/23 1757     Amph/Meth UR Negative     Barbiturate Ur Negative     Benzodiazepine Urine Negative     Cocaine Urine Negative     Methadone Urine Negative     Opiate Urine Negative     PCP Ur Negative     THC Urine Positive     Oxycodone Urine Negative    Narrative:      Presumptive report. If requested, specimen will be sent to reference lab for confirmation. FOR MEDICAL PURPOSES ONLY. IF CONFIRMATION NEEDED PLEASE CONTACT THE LAB WITHIN 5 DAYS.     Drug Screen Cutoff Levels:  AMPHETAMINE/METHAMPHETAMINES  1000 ng/mL  BARBITURATES     200 ng/mL  BENZODIAZEPINES     200 ng/mL  COCAINE      300 ng/mL  METHADONE      300 ng/mL  OPIATES      300 ng/mL  PHENCYCLIDINE     25 ng/mL  THC       50 ng/mL  OXYCODONE      100 ng/mL    UA (URINE) with reflex to Scope [[id number]]  (Abnormal) Collected: 07/03/23 1727    Lab Status: Final result Specimen: Urine, Clean Catch Updated: 07/03/23 1742     Color, UA Yellow     Clarity, UA Clear     Specific Gravity, UA 1.010     pH, UA 8.0     Leukocytes, UA Negative     Nitrite, UA Negative     Protein, UA Negative mg/dl      Glucose, UA Negative mg/dl      Ketones, UA 40 (2+) mg/dl      Urobilinogen, UA 0.2 E.U./dl      Bilirubin, UA Negative     Occult Blood, UA Negative    Salicylate level [[id number]]  (Normal) Collected: 07/03/23 1708    Lab Status: Final result Specimen: Blood from Arm, Left Updated: 13/60/79 4882     Salicylate Lvl <5 mg/dL     Acetaminophen level-If concentration is detectable, please discuss with medical  on call.  [[id number]]  (Abnormal) Collected: 07/03/23 1708    Lab Status: Final result Specimen: Blood from Arm, Left Updated: 07/03/23 1735     Acetaminophen Level <10 ug/mL     Comprehensive metabolic panel [[id number]]  (Abnormal) Collected: 07/03/23 1708    Lab Status: Final result Specimen: Blood from Arm, Left Updated: 07/03/23 1734     Sodium 138 mmol/L      Potassium 3.3 mmol/L      Chloride 102 mmol/L      CO2 24 mmol/L      ANION GAP 12 mmol/L      BUN 8 mg/dL      Creatinine 0.65 mg/dL      Glucose 91 mg/dL      Calcium 9.9 mg/dL      AST 15 U/L      ALT 19 U/L      Alkaline Phosphatase 64 U/L      Total Protein 7.6 g/dL      Albumin 5.2 g/dL      Total Bilirubin 1.59 mg/dL      eGFR 137 ml/min/1.73sq m     Narrative:      [place] guidelines for Chronic Kidney Disease (CKD):   •  Stage 1 with normal or high GFR (GFR > 90 mL/min/1.73 square meters)  •  Stage 2 Mild CKD (GFR = 60-89 mL/min/1.73 square meters)  •  Stage 3A Moderate CKD (GFR = 45-59 mL/min/1.73 square meters)  •  Stage 3B Moderate CKD (GFR = 30-44 mL/min/1.73 square meters)  •  Stage 4 Severe CKD (GFR = 15-29 mL/min/1.73 square meters)  •  Stage 5 End Stage CKD (GFR <15 mL/min/1.73 square meters)  Note: GFR calculation is accurate only with a steady state creatinine    Ethanol [[id number]]  (Normal) Collected: 07/03/23 1708    Lab Status: Final result Specimen: Blood from Arm, Left Updated: 07/03/23 1733     Ethanol Lvl <10 mg/dL     CBC and differential [[id number]] Collected: 07/03/23 1708    Lab Status: Final result Specimen: Blood from Arm, Left Updated: 07/03/23 1715     WBC 8.91 Thousand/uL      RBC 5.07 Million/uL      Hemoglobin 15.5 g/dL      Hematocrit 43.5 %      MCV 86 fL      MCH 30.6 pg      MCHC 35.6 g/dL      RDW 12.9 %      MPV 10.1 fL      Platelets 363 Thousands/uL      nRBC 0 /100 WBCs      Neutrophils Relative 69 %      Immat GRANS % 0 %      Lymphocytes Relative 22 %      Monocytes Relative 8 %      Eosinophils Relative 0 %      Basophils Relative 1 %      Neutrophils Absolute 6.13 Thousands/µL      Immature Grans Absolute 0.03 Thousand/uL      Lymphocytes Absolute 1.96 Thousands/µL      Monocytes Absolute 0.72 Thousand/µL      Eosinophils Absolute 0.02 Thousand/µL      Basophils Absolute 0.05 Thousands/µL                  No orders to display              Procedures  ECG 12 Lead Documentation Only    Date/Time: 7/3/2023 4:45 PM    Performed by: Maggy Quinonez DO  Authorized by: Maggy Quinonez DO    Patient location:  ED  Rate:     ECG rate:  84  Rhythm:     Rhythm: sinus rhythm    Ectopy:     Ectopy: none               ED Course                               SBIRT 20yo+    Flowsheet Row Most Recent Value   Initial Alcohol Screen: US AUDIT-C     1. How often do you have a drink containing alcohol? 0 Filed at: 07/03/2023 1809   2.  How many drinks containing alcohol do you have on a typical day you are drinking? 0 Filed at: 07/03/2023 1809   3a. Male UNDER 65: How often do you have five or more drinks on one occasion? 0 Filed at: 07/03/2023 1809   Audit-C Score 0 Filed at: 07/03/2023 1935   PARISH: How many times in the past year have you. .. Used an illegal drug or used a prescription medication for non-medical reasons? Never Filed at: 07/03/2023 8479                    Medical Decision Making  Differential diagnosis includes but is not limited to delirium, psychosis, psychotic break, schizophrenia, drug abuse,    Agitation: acute illness or injury  Psychosis [facility]): acute illness or injury  Amount and/or Complexity of Data Reviewed  External Data Reviewed: notes. Details: Reviewed notes from previous visit to ED yesterday for similar  Labs: ordered. Decision-making details documented in ED Course. ECG/medicine tests: ordered and independent interpretation performed. Decision-making details documented in ED Course. Discussion of management or test interpretation with external provider(s): Discussed case with crisis worker they are in agreement that patient is having some sort of mental health crisis. They were able to discuss with the patient and convince him to sign himself in on a 201    Risk  Prescription drug management. Risk Details: Patient was given prescription IV Ativan to help relax. Disposition  Final diagnoses:   Agitation   Psychosis ([address])     Time reflects when diagnosis was documented in both MDM as applicable and the Disposition within this note     Time User Action Codes Description Comment    7/3/2023  6:38 PM Royal Serna Add [R45.1] Agitation     7/3/2023  6:38 PM Davie Fernandez Add [F29] Psychosis [facility])       ED Disposition     ED Disposition   Transfer to [address] [place] [address]   --    Date/Time   Mon Jul 3, 2023  6:38 PM    Comment   Amr Marylene Dellen should be transferred out to behavioral health and has been medically cleared.            Follow-up Information    None         Patient's Medications   Discharge Prescriptions    No medications on file       No discharge procedures on file.     PDMP Review     None          ED Provider  Electronically Signed by           Anthony Groves DO  07/03/23 9772

## 2023-07-03 NOTE — ED NOTES
Met with patient to complete the crisis intake assessment and the safety risk assessment. Patient arrived via EMS after police found him with complaints of abdominal pain and his brain hurting. Patient was at this ER yesterday with paranoid delusions but did not want to sign in. There was no criteria at that time to 302. Patient symptoms appear to have worsened. He believes that there is something wrong with his brain and he is going to die. Patient reported that people are trying to kill him and someone is trying to poison him. Patient was tangential with tangential speech. Patient denied SI, HI, and psychotic symptoms. Patient began crying and stated that he was scared. Patient was a poor historian and was focused on his delusions. He stated that before last night he could sleep but now he is too afraid to sleep. Patient was offered a 201 but was indecisive. He was advised that the doctor would complete an involuntary admit and he agreed to sign the 201. Rights were explained, served, and understood.

## 2023-07-04 VITALS
TEMPERATURE: 98 F | SYSTOLIC BLOOD PRESSURE: 116 MMHG | OXYGEN SATURATION: 100 % | DIASTOLIC BLOOD PRESSURE: 71 MMHG | HEART RATE: 92 BPM | RESPIRATION RATE: 15 BRPM

## 2023-07-04 LAB
FLUAV RNA RESP QL NAA+PROBE: NEGATIVE
FLUBV RNA RESP QL NAA+PROBE: NEGATIVE
POTASSIUM SERPL-SCNC: 4.2 MMOL/L (ref 3.5–5.3)
RSV RNA RESP QL NAA+PROBE: NEGATIVE
SARS-COV-2 RNA RESP QL NAA+PROBE: NEGATIVE

## 2023-07-04 PROCEDURE — 36415 COLL VENOUS BLD VENIPUNCTURE: CPT | Performed by: EMERGENCY MEDICINE

## 2023-07-04 PROCEDURE — 84132 ASSAY OF SERUM POTASSIUM: CPT | Performed by: EMERGENCY MEDICINE

## 2023-07-04 PROCEDURE — 0241U HB NFCT DS VIR RESP RNA 4 TRGT: CPT | Performed by: EMERGENCY MEDICINE

## 2023-07-04 NOTE — ED NOTES
Call from Jean Paul Mccoy at [facility] intake to advise that they did need a couple of things before they can proceed the case for review. They requested COVID test, medical clearance note, consents/UR form, and pre cert. Explained that chart reads patient is uninsured. She advised that this could possibly change things with the admission and will advise reviewing provider. She will be faxing over concerns and UR paperwork to [facility] crisis fax. Crisis to follow up.

## 2023-07-04 NOTE — ED NOTES
Pt offered breakfast tray, only ate banana and drank water, encouraged fluids and breakfast meal, stated this is what he usually eats.       Karolyn John RN  07/04/23 6670

## 2023-07-04 NOTE — ED NOTES
Pt constantly requesting blankets pt is explained for safety multiple blankets will be taken away and face must be uncovered at all times.      Vernon Martin RN  07/04/23 0579

## 2023-07-04 NOTE — ED NOTES
Pt belongings sent home with Father during transport to [address] [place] [address], [address] [place] [address]  07/04/23 0747

## 2023-07-04 NOTE — ED NOTES
Insurance    Patient originally showed [address] \[facility] [place] [facility]". CIS called [facility] for [employer]. Patient was not found. CIS called CIS at [place] to verify the right number was used. He confirmed that [phone] is the correct number. He had registration there run the insurance with showed verified but they advised that they could not search further due to the patient being in [place] at this time. They advised to have our registration rerun general [address] Medicare. SLE did the same and found that the patient is uninsured. Patient not found on other searches utilized by registration, including [employer]. Attempted to call [facility]'s x3 but did no one answered.

## 2023-07-04 NOTE — ED NOTES
Met with patient. Patient was episodically suspicious though signed the voluntary paperwork. Patient voiced support with transfer to [facility]. Faxed voluntary paperwork, pre cert, and updated facesheet to [facility]. Awaiting response in order to arrange transport.

## 2023-07-04 NOTE — ED NOTES
Trace from [facility] requested a call back at [phone] with  time.      Niru Segal RN  07/04/23 9124

## 2023-07-04 NOTE — ED NOTES
[facility] is willing to accept the patient, however, they want his potassium to be above 4. It is currently 3.3. EDMD put orders in to retest at 0700. [facility] will accept if at acceptable level.

## 2023-07-04 NOTE — EMTALA/ACUTE CARE TRANSFER
[facility] EMERGENCY DEPARTMENT  [address] [place] [address]  Dept: [phone]      EMTALA TRANSFER CONSENT    NAME Ann Rolle 2000                              MRN [id number]    I have been informed of my rights regarding examination, treatment, and transfer   by Dr. Kashmir Martinez,     Benefits: Specialized equipment and/or services available at the receiving facility (Include comment)________________________    Risks: Potential for delay in receiving treatment, Potential deterioration of medical condition, Increased discomfort during transfer, Possible worsening of condition or death during transfer      Consent for Transfer:  I acknowledge that my medical condition has been evaluated and explained to me by the emergency department physician or other qualified medical person and/or my attending physician, who has recommended that I be transferred to the service of  Accepting Physician: Katherin Solders at [facility] Name, [facility] : [address] [place] [address]. The above potential benefits of such transfer, the potential risks associated with such transfer, and the probable risks of not being transferred have been explained to me, and I fully understand them. The doctor has explained that, in my case, the benefits of transfer outweigh the risks. I agree to be transferred. I authorize the performance of emergency medical procedures and treatments upon me in both transit and upon arrival at the receiving facility. Additionally, I authorize the release of any and all medical records to the receiving facility and request they be transported with me, if possible. I understand that the safest mode of transportation during a medical emergency is an ambulance and that the Hospital advocates the use of this mode of transport.  Risks of traveling to the receiving facility by car, including absence of medical control, life sustaining equipment, such as oxygen, and medical personnel has been explained to me and I fully understand them. (MADALYN CORRECT BOX BELOW)  [  ]  I consent to the stated transfer and to be transported by ambulance/helicopter. [  ]  I consent to the stated transfer, but refuse transportation by ambulance and accept full responsibility for my transportation by car. I understand the risks of non-ambulance transfers and I exonerate the Hospital and its staff from any deterioration in my condition that results from this refusal.    X___________________________________________    DATE  07/04/23  TIME________  Signature of patient or legally responsible individual signing on patient behalf           RELATIONSHIP TO PATIENT_________________________          Provider Certification    NAME Ann Mills 2000                              MRN [id number]    A medical screening exam was performed on the above named patient. Based on the examination:    Condition Necessitating Transfer The primary encounter diagnosis was Agitation. A diagnosis of Psychosis ([address]) was also pertinent to this visit.     Patient Condition: The patient has been stabilized such that within reasonable medical probability, no material deterioration of the patient condition or the condition of the unborn child(manfred) is likely to result from the transfer    Reason for Transfer: Level of Care needed not available at this facility    Transfer Requirements: [address]   · Space available and qualified personnel available for treatment as acknowledged by [place]  · Agreed to accept transfer and to provide appropriate medical treatment as acknowledged by       Stefanie Smith  · Appropriate medical records of the examination and treatment of the patient are provided at the time of transfer   [address] _______  · Transfer will be performed by qualified personnel from    and appropriate transfer equipment as required, including the use of necessary and appropriate life support measures. Provider Certification: I have examined the patient and explained the following risks and benefits of being transferred/refusing transfer to the patient/family:  General risk, such as traffic hazards, adverse weather conditions, rough terrain or turbulence, possible failure of equipment (including vehicle or aircraft), or consequences of actions of persons outside the control of the transport personnel, Unanticipated needs of medical equipment and personnel during transport, Risk of worsening condition, The possibility of a transport vehicle being unavailable      Based on these reasonable risks and benefits to the patient and/or the unborn child(manfred), and based upon the information available at the time of the patient’s examination, I certify that the medical benefits reasonably to be expected from the provision of appropriate medical treatments at another medical facility outweigh the increasing risks, if any, to the individual’s medical condition, and in the case of labor to the unborn child, from effecting the transfer.     X____________________________________________ DATE 07/04/23        TIME_______      ORIGINAL - SEND TO MEDICAL RECORDS   COPY - SEND WITH PATIENT DURING TRANSFER

## 2023-07-04 NOTE — ED NOTES
[place] Bed Search    [facility]'s:  Beds are available per [facility]. Clinical faxed for review    [facility]:  Per [facility], no beds tonight but can be reviewed tomorrow for d/c bed. Clinical faxed for review.

## 2023-07-04 NOTE — ED NOTES
Lyla Molina in admissions at [facility] was updated with insurance authorization. Patient to sign voluntary paperwork. CIS to follow up.

## 2023-07-04 NOTE — ED NOTES
Insurance Authorization for admission:   Phone call placed to [facility] number: [phone]  Spoke to [facility]     4 days approved (7/3-7/6/2023)   Level of care: acute inpatient MH/  Review on 7/6/2023  Authorization # [id number]    Case assigned to [address] who will reach out to  on 7/6/23. EVS (Eligibility Verification System) called - [phone].   Automated system indicates[facility] Author[facility] for Transportation:    TBD

## 2023-07-04 NOTE — ED NOTES
Call from Valerie Sauer at [facility]. Patient accepted. They verified active coverage with [facility] HMO Medicare. She will be faxing over voluntary paperwork and insurance information. Patient is accepted at [facility]  Patient is accepted by Dr. Asif Reese per Valerie Castaneda: [id number]  Tax ID: [id number]  UR: Devante Macario  Ph: [phone]  Fax: [phone]    [facility] will be faxing over voluntary paperwork and insurance information to [facility] Crisis fax. CIS to complete pre cert. Patient must sign voluntary paperwork. [facility] to be notified upon completion of voluntary paperwork and pre cert at [phone].

## 2023-07-04 NOTE — ED CARE HANDOFF
Emergency Department Sign Out Note                  ED Course as of 07/04/23 1510   Tue Jul 04, 2023   1505 The patient was accepted by Dr Guerita Dejesus at [facility]. Procedures  MDM        Disposition  Final diagnoses:   Agitation   Psychosis ([address])     Time reflects when diagnosis was documented in both MDM as applicable and the Disposition within this note     Time User Action Codes Description Comment    7/3/2023  6:38 PM Mandylaura Velázquez Add [R45.1] Agitation     7/3/2023  6:38 PM Mary Fernandez Add [F29] Psychosis [facility])       ED Disposition     ED Disposition   Transfer to [address] [place] [address]   --    Date/Time   Mon Jul 3, 2023  6:38 PM    Comment   Ann Arguello should be transferred out to behavioral health and has been medically cleared.            MD Documentation    [facility] Most Recent Value   Patient Condition The patient has been stabilized such that within reasonable medical probability, no material deterioration of the patient condition or the condition of the unborn child(manfred) is likely to result from the transfer   Reason for Transfer Level of Care needed not available at this facility   Benefits of Transfer Specialized equipment and/or services available at the receiving facility (Include comment)________________________   Risks of Transfer Potential for delay in receiving treatment, Potential deterioration of medical condition, Increased discomfort during transfer, Possible worsening of condition or death during transfer   Accepting Physician Luis Rodriguez Name, [facility] THE [address] (Name & Tel number) PAC`   Sending MD Miguel Angel Cardoza DO   Provider Certification General risk, such as traffic hazards, adverse weather conditions, rough terrain or turbulence, possible failure of equipment (including vehicle or aircraft), or consequences of actions of persons outside the control of the transport personnel, Unanticipated needs of medical equipment and personnel during transport, Risk of worsening condition, The possibility of a transport vehicle being unavailable      RN Documentation    [address] Name, [facility] [employer] (Name & Tel number) PAC`      Follow-up Information    None       Patient's Medications   Discharge Prescriptions    No medications on file     No discharge procedures on file.        ED Provider  Electronically Signed by     Tia Kimbrough DO  07/04/23 0411

## 2023-07-04 NOTE — ED NOTES
LINDSAY CHASE spoke with [facility]. They accepted pt but do not have a bed today. They were also verifying insurance. They found that pt has [address] but if it is inactive, they will not be able to take pt. [employer] provided [place] phone number.

## 2023-07-04 NOTE — ED NOTES
Patient is accepted at [facility] for 7/4/23. Patient is accepted by Dr. Sascha Yen per [address] is arranged with **.  TBD  Transportation is scheduled for **. [facility] [place]:      CORIN[place]  [id number]      Tax ID:  [id number]    Dr. Sascha Yen NPI: [id number]    UR:   Norberto Flemingalejandra      Phone:  [phone]           Fax:  [phone]

## 2023-07-04 NOTE — ED NOTES
Received pt with sneakers on and belongings at bedside, requested he remove sneakers, cooperated, belongs put in [address], [place]  07/04/23 4391

## 2023-07-04 NOTE — ED NOTES
Pt brushed teeth offered activities, requested to sleep a little longer.      Katharina Gutierrez RN  07/04/23 8556

## 2023-07-04 NOTE — ED NOTES
[facility] advised that RN report can be arranged. Call [phone] and ask for admissions. They will then  you to the [facility]. Transport can be arranged after report.

## 2023-07-05 LAB
ATRIAL RATE: 72 BPM
P AXIS: 73 DEGREES
PR INTERVAL: 150 MS
QRS AXIS: 83 DEGREES
QRSD INTERVAL: 96 MS
QT INTERVAL: 376 MS
QTC INTERVAL: 411 MS
T WAVE AXIS: 71 DEGREES
VENTRICULAR RATE: 72 BPM

## 2023-07-05 PROCEDURE — 93010 ELECTROCARDIOGRAM REPORT: CPT | Performed by: INTERNAL MEDICINE

## 2023-07-06 LAB
ATRIAL RATE: 84 BPM
P AXIS: 73 DEGREES
PR INTERVAL: 158 MS
QRS AXIS: 71 DEGREES
QRSD INTERVAL: 100 MS
QT INTERVAL: 366 MS
QTC INTERVAL: 432 MS
T WAVE AXIS: 66 DEGREES
VENTRICULAR RATE: 84 BPM

## 2023-07-06 PROCEDURE — 93010 ELECTROCARDIOGRAM REPORT: CPT | Performed by: INTERNAL MEDICINE
